# Patient Record
Sex: FEMALE | Race: WHITE | NOT HISPANIC OR LATINO | Employment: FULL TIME | URBAN - METROPOLITAN AREA
[De-identification: names, ages, dates, MRNs, and addresses within clinical notes are randomized per-mention and may not be internally consistent; named-entity substitution may affect disease eponyms.]

---

## 2017-02-10 ENCOUNTER — OFFICE VISIT (OUTPATIENT)
Dept: UROLOGY | Facility: CLINIC | Age: 32
End: 2017-02-10
Payer: COMMERCIAL

## 2017-02-10 ENCOUNTER — TELEPHONE (OUTPATIENT)
Dept: UROLOGY | Facility: CLINIC | Age: 32
End: 2017-02-10

## 2017-02-10 VITALS
SYSTOLIC BLOOD PRESSURE: 121 MMHG | DIASTOLIC BLOOD PRESSURE: 78 MMHG | HEIGHT: 67 IN | WEIGHT: 152.13 LBS | BODY MASS INDEX: 23.88 KG/M2 | HEART RATE: 72 BPM

## 2017-02-10 DIAGNOSIS — R33.9 INCOMPLETE EMPTYING OF BLADDER: ICD-10-CM

## 2017-02-10 DIAGNOSIS — M62.89 HIGH-TONE PELVIC FLOOR DYSFUNCTION: Primary | ICD-10-CM

## 2017-02-10 DIAGNOSIS — N36.8 PAIN IN URETHRAL MEATUS: ICD-10-CM

## 2017-02-10 PROCEDURE — 99999 PR PBB SHADOW E&M-EST. PATIENT-LVL III: CPT | Mod: PBBFAC,,, | Performed by: UROLOGY

## 2017-02-10 PROCEDURE — 99213 OFFICE O/P EST LOW 20 MIN: CPT | Mod: S$GLB,,, | Performed by: UROLOGY

## 2017-02-10 RX ORDER — FLUVOXAMINE MALEATE 100 MG/1
100 TABLET, COATED ORAL
COMMUNITY

## 2017-02-10 RX ORDER — NORETHINDRONE ACETATE AND ETHINYL ESTRADIOL, ETHINYL ESTRADIOL AND FERROUS FUMARATE 1MG-10(24)
KIT ORAL
COMMUNITY
Start: 2017-02-09

## 2017-02-10 RX ORDER — LIDOCAINE HYDROCHLORIDE 20 MG/ML
JELLY TOPICAL DAILY
Qty: 240 ML | Refills: 3 | Status: SHIPPED | OUTPATIENT
Start: 2017-02-10

## 2017-02-10 NOTE — MR AVS SNAPSHOT
[facility] Urology 4th Floor  [address]  [facility] [address]  Phone: [phone]                  Dolores Nicole   2/10/2017 3:40 PM   Office Visit    Description:  Female : 1985   Provider:  Marie Bergman MD   Department:  [facility] Urolog 4th Floor           Reason for Visit     Follow-up           Diagnoses this Visit        Comments    High-tone pelvic floor dysfunction    -  Primary     Incomplete emptying of bladder         Pain in urethral meatus                To Do List           Future Appointments        Provider Department Dept Phone    3/10/2017 10:40 AM Marie Bergman MD [facility] Urology 4th Floor [phone]      Goals (5 Years of Data)     None       These Medications        Disp Refills Start End    LIDOCAINE 2 %, VALIUM 5 MG, BACLOFEN 4 % SUPPOSITORY 30 each 3 2/10/2017     Place 1 suppository rectally 2 (two) times daily. - Rectal    Pharmacy: [facility] [id number] [facility] Ph #: [phone]       lidocaine HCL 2% (XYLOCAINE) 2 % jelly 240 mL 3 2/10/2017     Apply topically once daily. - Topical (Top)    Pharmacy: [facility] - [facility] [id number] [facility] Ph #: [phone]         [facility] On [facility]     [facility] On Call Nurse Care Line -  Assistance  Registered nurses in the [facility] provide clinical advisement, health education, appointment booking, and other advisory services.  Call for this free service at [phone].             Medications           Message regarding Medications     Verify the changes and/or additions to your medication regime listed below are the same as discussed with your clinician today.  If any of these changes or additions are incorrect, please notify your healthcare provider.        START taking these NEW medications        Refills    LIDOCAINE 2 %, VALIUM 5 MG, BACLOFEN 4 % SUPPOSITORY 3    Sig: Place 1 suppository rectally 2 (two) times daily.    Class: Print     "Route: Rectal    lidocaine HCL 2% (XYLOCAINE) 2 % jelly 3    Sig: Apply topically once daily.    Class: Normal    Route: Topical (Top)      STOP taking these medications     etonogestrel-ethinyl estradiol (NUVARING) 0.12-0.015 mg/24 hr vaginal ring Place 1 each vaginally every 28 days.    hydrocodone-acetaminophen 7.5-325mg (NORCO) 7.5-325 mg per tablet Take 1 tablet by mouth every 6 (six) hours as needed for Pain.           Verify that the below list of medications is an accurate representation of the medications you are currently taking.  If none reported, the list may be blank. If incorrect, please contact your healthcare provider. Carry this list with you in case of emergency.           Current Medications     clonazepam (KLONOPIN) 1 MG tablet Take 1 mg by mouth once daily.      LO LOESTRIN FE 1 mg-10 mcg (24)/10 mcg (2) Tab     fluvoxaMINE (LUVOX) 100 MG tablet Take 100 mg by mouth.    LIDOCAINE 2 %, VALIUM 5 MG, BACLOFEN 4 % SUPPOSITORY Place 1 suppository rectally 2 (two) times daily.    lidocaine HCL 2% (XYLOCAINE) 2 % jelly Apply topically once daily.    antonio-m.blue-s.phos-phsal-hyo 118-10-40.8-36 mg Cap Take 1 capsule by mouth 4 (four) times daily.    tamsulosin (FLOMAX) 0.4 mg Cp24 Take 1 capsule (0.4 mg total) by mouth once daily.           Clinical Reference Information           Your Vitals Were     BP Pulse Height Weight Last Period BMI    121/78 72 5' 7" (1.702 m) 69 kg (152 lb 1.9 oz) 01/23/2017 23.82 kg/m2      Blood Pressure          Most Recent Value    BP  121/78      Allergies as of 2/10/2017     Ceclor [Cefaclor]    Entex [Pseudoephedrine Tannate]    Hydrocodone      Immunizations Administered on Date of Encounter - 2/10/2017     None      [facility] Sign-Up     Activating your [facility] account is as easy as 1-2-3!     1) Visit [email or web address], select Sign Up Now, enter this activation code and your date of birth, then select Next.  [email or web address]  Expires: 3/27/2017  5:38 PM      2) " Create a username and password to use when you visit [facility] in the future and select a security question in case you lose your password and select Next.    3) Enter your e-mail address and click Sign Up!    Additional Information  If you have questions, please e-mail [email or web address] or call [phone] to talk to our [employer] staff. Remember, [facility] is NOT to be used for urgent needs. For medical emergencies, dial 911.         Language Assistance Services     ATTENTION: Language assistance services are available, free of charge. Please call [phone].      ATENCIÓN: Si habla español, tiene a rivas disposición servicios gratuitos de asistencia lingüística. Llame al [phone].     CHÚ Ý: N?u b?n nói Ti?ng Vi?t, có các d?ch v? h? tr? ngôn ng? mi?n phí dành cho b?n. G?i s? [phone].         [facility] 4th Floor complies with applicable Federal civil rights laws and does not discriminate on the basis of race, color, national origin, age, disability, or sex.

## 2017-02-10 NOTE — TELEPHONE ENCOUNTER
----- Message from Nisa Sanford sent at 2/10/2017 12:23 PM CST -----  Contact: pt [phone]  Pt states she is having urinary retention and pain every time she voids for the past few days and  Dr Bergman told her to leave a message for Teri if she has these problems.

## 2017-02-11 NOTE — PROGRESS NOTES
CHIEF COMPLAINT:    Mrs. Nicole is a 31 y.o. female presenting for difficulty urinating    PRESENTING ILLNESS:    Dolores Nicole is a 31 y.o. female was fit in today as an urgent appointment.  She states that the urination continues to be an issue.  She still has to splash water on herself in order to urinate and that it continues to be painful in spite of going to physical therapy.  Neisha Xochilt has been releasing some muscle tension but she states that the area that is tender is the urethra.  Vaginally, she is better.  However, today with my exam, she could not relax enough to allow even a urojet to be injected into the bladder.  She notes that sometimes the urine sprays rather than being a stream.      REVIEW OF SYSTEMS:    Review of Systems   Constitutional: Negative.    HENT: Negative.    Eyes: Negative.    Respiratory: Negative.    Cardiovascular: Negative.    Gastrointestinal: Negative for diarrhea.   Genitourinary: Positive for dysuria and urgency.   Musculoskeletal: Negative.    Skin: Negative.    Neurological: Negative.    Endo/Heme/Allergies: Negative.    Psychiatric/Behavioral: Negative.      PATIENT HISTORY:    Past Medical History   Diagnosis Date    Anxiety disorder      OCD       Past Surgical History   Procedure Laterality Date    Tonsillectomy, adenoidectomy, bilateral myringotomy and tubes      Cystoscopy w/ retrogrades      Tympanoplasty      Anterior cruciate ligament repair      [place] tooth extraction       2004       Family History   Problem Relation Age of Onset    Hypertension Father     Cancer Maternal Grandfather      Lung Cancer    Cancer Paternal Grandmother     Cancer Paternal Grandfather      Social History    Marital status:      Social History Main Topics    Smoking status: Never Smoker    Smokeless tobacco: Never Used    Alcohol use Yes      Comment: social    Drug use: No    Sexual activity: Yes     Partners: Male      Comment: Nuvaring       Allergies:  Ceclor  [cefaclor]; Entex [pseudoephedrine tannate]; and Hydrocodone    Medications:  Outpatient Encounter Prescriptions as of 2/10/2017   Medication Sig Dispense Refill    clonazepam (KLONOPIN) 1 MG tablet Take 1 mg by mouth once daily.        LO LOESTRIN FE 1 mg-10 mcg (24)/10 mcg (2) Tab       fluvoxaMINE (LUVOX) 100 MG tablet Take 100 mg by mouth.      LIDOCAINE 2 %, VALIUM 5 MG, BACLOFEN 4 % SUPPOSITORY Place 1 suppository rectally 2 (two) times daily. 30 each 3    lidocaine HCL 2% (XYLOCAINE) 2 % jelly Apply topically once daily. 240 mL 3    methen-m.blue-s.phos-phsal-hyo 118-10-40.8-36 mg Cap Take 1 capsule by mouth 4 (four) times daily. 120 capsule 1    tamsulosin (FLOMAX) 0.4 mg Cp24 Take 1 capsule (0.4 mg total) by mouth once daily. 30 capsule 1     No facility-administered encounter medications on file as of 2/10/2017.          PHYSICAL EXAMINATION:    The patient generally appears in good health, is appropriately interactive, and is in no apparent distress.    Skin: No lesions.    Mental: Cooperative with normal affect.    Neuro: Grossly intact.    HEENT: Normal. No evidence of lymphadenopathy.    Chest:  normal inspiratory effort.    Abdomen: BS active. Soft, non-tender. No masses or organomegaly. Bladder is not palpable. No evidence of flank discomfort. No evidence of inguinal hernia.    Extremities: No clubbing, cyanosis, or edema    Normal external female genitalia  Urethral meatus is normal, externally, appears normal.  Urethra and bladder are tender to bimanual exam  Well supported anteriorly and posteriorly  Increased pelvic floor tone, could not place urojet  PVR by bladder scan checked by the nurse was 250 ml    IMPRESSION:    Encounter Diagnoses   Name Primary?    High-tone pelvic floor dysfunction Yes    Incomplete emptying of bladder     Pain in urethral meatus        PLAN:    1. Cystoscopy under anesthesia with botox of the area around the bladder neck and urethra   2.  If she has scar  tissue will see how she does, and if she still has difficulty urinating, then would consider female urethroplasty but she should be able to tolerate the catheter better after the Botox  3.  Lidocaine, valium, baclofen suppositories once a night  4.  Lidocaine gel Rx'd

## 2017-02-13 ENCOUNTER — TELEPHONE (OUTPATIENT)
Dept: UROLOGY | Facility: CLINIC | Age: 32
End: 2017-02-13

## 2017-02-13 DIAGNOSIS — R33.9 INCOMPLETE EMPTYING OF BLADDER: Primary | ICD-10-CM

## 2017-02-13 DIAGNOSIS — R39.89 BLADDER PAIN: ICD-10-CM

## 2017-02-13 NOTE — TELEPHONE ENCOUNTER
spoike to the pharmacist.  They will use a 2 ml mold and use 30 mg lidocaine and 70 mg baclofen, which just approximates the percentages, for consistency.

## 2017-02-13 NOTE — TELEPHONE ENCOUNTER
----- Message from Teri Mcgrath LPN sent at 2/13/2017  9:31 AM CST -----  Contact: [facility] [phone]      ----- Message -----     From: Nisa Sanford     Sent: 2/13/2017   9:06 AM       To: Madalyn Salazar Staff    States he is calling to clarify the strength is correct on medication LIDOCAINE 2 %, VALIUM 5 MG, BACLOFEN 4 % SUPPOSITORY.     [facility]  [phone]

## 2017-02-14 ENCOUNTER — TELEPHONE (OUTPATIENT)
Dept: UROLOGY | Facility: CLINIC | Age: 32
End: 2017-02-14

## 2017-02-14 NOTE — TELEPHONE ENCOUNTER
----- Message from Marie Bergman MD sent at 2/13/2017  6:24 PM CST -----  Contact: [phone]  It is OK for the patient to be off for a couple of days.  OK to hold PT for now.    ----- Message -----     From: Teri Mcgrath LPN     Sent: 2/13/2017   3:44 PM       To: Marie Bergman MD        ----- Message -----     From: Ayde Gamble MA     Sent: 2/13/2017   3:40 PM       To: Madalyn Salazar Staff    Pt has questions about how long she should take off work for her procedure. She states that Dr. Bergman told her that after botox her symptoms get worse before they get better so she does not want to be at work having those issues. Also, she wants to know should she continue PT

## 2017-02-14 NOTE — TELEPHONE ENCOUNTER
LM on pt's VM informing her, per , that it will be okay to take a couple of days off and she is able to put physical therapy on hold for now.

## 2017-02-15 ENCOUNTER — TELEPHONE (OUTPATIENT)
Dept: UROLOGY | Facility: CLINIC | Age: 32
End: 2017-02-15

## 2017-02-15 NOTE — TELEPHONE ENCOUNTER
----- Message from Marie Bergman MD sent at 2/13/2017  6:24 PM CST -----  Contact: [phone]  It is OK for the patient to be off for a couple of days.  OK to hold PT for now.    ----- Message -----     From: Teri Mcgrath LPN     Sent: 2/13/2017   3:44 PM       To: Marie Bergman MD        ----- Message -----     From: Ayde Gamble MA     Sent: 2/13/2017   3:40 PM       To: [facility] Salazar Staff    Pt has questions about how long she should take off work for her procedure. She states that Dr. Bergman told her that after botox her symptoms get worse before they get better so she does not want to be at work having those issues. Also, she wants to know should she continue PT    Pt notified

## 2017-03-13 ENCOUNTER — ANESTHESIA EVENT (OUTPATIENT)
Dept: SURGERY | Facility: HOSPITAL | Age: 32
End: 2017-03-13
Payer: COMMERCIAL

## 2017-03-13 NOTE — ANESTHESIA PREPROCEDURE EVALUATION
Pre Admission Screening  Charlotte Oneil RN      []Hide copied text  Anesthesia Assessment: Preoperative EQUATION     Planned Procedure: Procedure(s) (LRB):  CYSTOSCOPY (N/A)  INJECTION-BOTOX TO THE BLADDER NECK (N/A)  Requested Anesthesia Type:Monitor Anesthesia Care  Surgeon: Marie Bergman MD  Service: Urology  Known or anticipated Date of Surgery:3/21/2017     Patient Active Problem List   Diagnosis    Knee pain, right    S/P right ACL reconstruction on 4/12/12    MARKOS (stress urinary incontinence, female)    Urethral stricture    Pelvic floor dysfunction       Surgeon notes: reviewed     Electronic QUestionnaire Assessment completed via nurse interview with patient.                    Dolores Nicole [[id number]] - 31 y.o. Female         Providers Outside of [facility]       No data to display        Surgical Risk Level       Surgical Risk Level:   1              caRDScore (Clinical Anesthesia Rapid Decision Score)         Very Very Low  Total Score: 1       1 Sum of Clinical Scores        caRDScores (Grouped)       caRDScore - Ane:   1                       caRDScore - CVD:   -1                       caRDScore - Pul:   0                       caRDScore - Met:   0                       caRDScore - Phy:   1              caRDScore Items             Pre[facility]admit from 3/21/2017 in [facility]      Anesthesia        Has chronic anxiety   Yes      CVD        Activity similar to best ability for maximal activity or exercise   METS 5      Pulmonary        Metabolic        Physiologic        Obesity Status   Not Obese (BMI <30)      Has problems emptying bladder/ u. retent. due to nerve/prostate/bladder/pelvic dis.   Yes        Flags       Red Flag Score:   0                       Yellow Flag Score:   1              Red Flags             Pre-admit from 3/21/2017 in [facility]      Obesity Status   Not Obese (BMI <30)        Yellow Flags             Pre-admit from 3/21/2017 in  [facility]      Obesity Status   Not Obese (BMI <30)      Has pain   Yes        PONV Risk Score (assumes periop narcotic use = +1, Max=4)       PONV Risk Score:   3              PONV Risk Factors  Total Score: 2       1 Female      1 Non-Smoker at present        Sleep Apnea  Total Score: 0         JOSE ALBERTO STOP-Bang Risk Factors (Max=8)  Total Score: 0         JOSE ALBERTO Risk Level - 1 (Low), 2 (Moderate), 3 (High)       JOSE ALBERTO Risk Level:   0              RCRI (Revised Cardiac Risk Indices of ACC/AHA guidelines, Max=6)  Total Score: 0         CAD Risk Factors  Total Score: 1       1 Exercise on a routine basis        CHADS Score if applicable (history of atrial fib/flutter, Max=6)  Total Score: 0         Maximal Exercise Capacity             Pre-admit from 3/21/2017 in [facility]      Maximal Exercise Capacity   METS 5        Summary of Dependence  Total Score: 1       1 Is totally independent of others for activities of daily living        Phone Fraility Score (Max = 17)  Total Score: 0         Pain Factors             Pre-admit from 3/21/2017 in [facility]      Has pain   Yes      Location and description of pain   bladder      Typical Pain Scores   0 to 4      Not using strong pain medications   Yes [lidocaine and baclofen suppositories at night]        Risk Triggers (Evidence-Based Risk Triggers)         Pulmonary Risk Triggers  Total Score: 0         Renal Risk Triggers  Total Score: 0         Delirium Risk Triggers  Total Score: 0         Urologic Risk Triggers  Total Score: 1       1 Has problems emptying bladder/ u. retent. due to nerve/prostate/bladder/pelvic dis.        Logistics         Pre-op Clinic Logistics  Total Score: 1       1 Has had anesthesia, either as adult or as a child        DOSC Logistics  Total Score: 0         Discharge Logistics  Total Score: 0         Discharge Planning             Pre-admit from 3/21/2017 in [facility]       Discharge Planning        Will assist patient 24/7, if needed   mom      Who will transport you to therapy, if need   mom        Fast Track <For office use only>       Total Score: 1          Surgical Risk Level Assessment                       Triage considerations:      The patient has no apparent active cardiac condition (No unstable coronary Syndrome such as severe unstable angina or recent [<1 month] myocardial infarction, decompensated CHF, severe valvular disease or significant arrhythmia)     Previous anesthesia records:GETA, MAC and No problems urethra dilation 11/08/16; Placement Time: 1037; Inserted by: CRNA; Airway Device: LMA; Mask Ventilation: Easy; Intubated: Postinduction; Airway Device Size: 4.0; Style: Cuffed; Cuff Inflation: Minimal occlusive pressure; Inflation Amount: 25; Placement Verified By: Auscultation, Capnometry; Complicating Factors: None; Intubation Findings: Positive EtCO2, Bilateral breath sounds, Atraumatic/Condition of teeth unchanged; Securment: Lips; Complications: None; Breath Sounds: Equal Bilateral; Insertion Attempts: 1;      [facility]   Subspecialty notes: n/a     Other important co-morbidities: none noted      Tests already available:  No recent tests.      Instructions given. (See in Nurse's note)     Optimization:  Anesthesia Preop Clinic Assessment Indicated-not required for this procedure      Plan:   Testing: BMP  Patient has previously scheduled Medical Appointment:none     Navigation: Tests Scheduled. Am of surgery      Straight Line to surgery.   No tests, anesthesia preop clinic visit, or consult required.           Electronically signed by Charlotte Oneil RN at 3/13/2017 11:53 AM        Pre-admit on 3/21/2017              Detailed Report                                                                                                                         03/13/2017  Congersed Nicole is a 31 y.o., female.    [facility] Anesthesia  Evaluation    I have reviewed the Patient Summary Reports.    I have reviewed the Nursing Notes.   I have reviewed the Medications.     Review of Systems      Physical Exam  General:  Well nourished    Airway/Jaw/Neck:  Airway Findings: Mouth Opening: Normal General Airway Assessment: Adult  Mallampati: II  Improves to II with phonation.  Jaw/Neck Findings:  Limited Ability to Prognath  Neck ROM: Normal ROM     Eyes/Ears/Nose:  Eyes/Ears/Nose Findings:    Dental:  Dental Findings: In tact   Chest/Lungs:  Chest/Lungs Findings: Clear to auscultation, Normal Respiratory Rate     Heart/Vascular:  Heart Findings: Rate: Normal  Rhythm: Regular Rhythm  Sounds: Normal     Abdomen:  Abdomen Findings:  Normal     Musculoskeletal:  Musculoskeletal Findings:    Skin:  Skin Findings:     Mental Status:  Mental Status Findings:  Cooperative, Alert and Oriented         Anesthesia Plan  Type of Anesthesia, risks & benefits discussed:  Anesthesia Type:  general, MAC  Patient's Preference:   Intra-op Monitoring Plan:   Intra-op Monitoring Plan Comments:   Post Op Pain Control Plan:   Post Op Pain Control Plan Comments:   Induction:   IV  Beta Blocker:  Patient is not currently on a Beta-Blocker (No further documentation required).       Informed Consent: Patient understands risks and agrees with Anesthesia plan.  Questions answered. Anesthesia consent signed with patient.  ASA Score: 1     Day of Surgery Review of History & Physical:    H&P update referred to the surgeon.         Ready For Surgery From Anesthesia Perspective.

## 2017-03-13 NOTE — PRE ADMISSION SCREENING
Anesthesia Assessment: Preoperative EQUATION    Planned Procedure: Procedure(s) (LRB):  CYSTOSCOPY (N/A)  INJECTION-BOTOX TO THE BLADDER NECK (N/A)  Requested Anesthesia Type:Monitor Anesthesia Care  Surgeon: Marie Bergman MD  Service: Urology  Known or anticipated Date of Surgery:3/21/2017    Surgeon notes: reviewed    Electronic QUestionnaire Assessment completed via nurse interview with patient.        Dolores Nicole [[id number]] - 31 y.o. Female        Providers Outside of [facility]      No data to display       Surgical Risk Level      Surgical Risk Level:  1           caRDScore (Clinical Anesthesia Rapid Decision Score)        Very Very Low  Total Score: 1      1 Sum of Clinical Scores       caRDScores (Grouped)      caRDScore - Ane:  1                caRDScore - CVD:  -1                caRDScore - Pul:  0                caRDScore - Met:  0                caRDScore - Phy:  1           caRDScore Items           Pre-admit from 3/21/2017 in [facility]     Anesthesia      Has chronic anxiety  Yes     CVD      Activity similar to best ability for maximal activity or exercise  METS 5     Pulmonary      Metabolic      Physiologic      Obesity Status  Not Obese (BMI <30)     Has problems emptying bladder/ u. retent. due to nerve/prostate/bladder/pelvic dis.  Yes       Flags      Red Flag Score:  0                Yellow Flag Score:  1           Red Flags           Pre-admit from 3/21/2017 in [facility]     Obesity Status  Not Obese (BMI <30)       Yellow Flags           Pre-admit from 3/21/2017 in [facility]     Obesity Status  Not Obese (BMI <30)     Has pain  Yes       PONV Risk Score (assumes periop narcotic use = +1, Max=4)      PONV Risk Score:  3           PONV Risk Factors  Total Score: 2      1 Female     1 Non-Smoker at present       Sleep Apnea  Total Score: 0        JOSE ALBERTO STOP-Bang Risk Factors (Max=8)  Total Score: 0        JOSE ALBERTO Risk Level - 1 (Low),  2 (Moderate), 3 (High)      JOSE ALBERTO Risk Level:  0           RCRI (Revised Cardiac Risk Indices of ACC/AHA guidelines, Max=6)  Total Score: 0        CAD Risk Factors  Total Score: 1      1 Exercise on a routine basis       CHADS Score if applicable (history of atrial fib/flutter, Max=6)  Total Score: 0        Maximal Exercise Capacity           Pre-admit from 3/21/2017 in [facility]     Maximal Exercise Capacity  METS 5       Summary of Dependence  Total Score: 1      1 Is totally independent of others for activities of daily living       Phone Fraility Score (Max = 17)  Total Score: 0        Pain Factors           Pre-admit from 3/21/2017 in [facility]     Has pain  Yes     Location and description of pain  bladder     Typical Pain Scores  0 to 4     Not using strong pain medications  Yes [lidocaine and baclofen suppositories at night]       Risk Triggers (Evidence-Based Risk Triggers)        Pulmonary Risk Triggers  Total Score: 0        Renal Risk Triggers  Total Score: 0        Delirium Risk Triggers  Total Score: 0        Urologic Risk Triggers  Total Score: 1      1 Has problems emptying bladder/ u. retent. due to nerve/prostate/bladder/pelvic dis.       Logistics        Pre-op Clinic Logistics  Total Score: 1      1 Has had anesthesia, either as adult or as a child       DOSC Logistics  Total Score: 0        Discharge Logistics  Total Score: 0        Discharge Planning           Pre-admit from 3/21/2017 in [facility]     Discharge Planning      Will assist patient 24/7, if needed  mom     Who will transport you to therapy, if need  mom       Fast Track <For office use only>      Total Score: 1        Surgical Risk Level Assessment                 Triage considerations:     The patient has no apparent active cardiac condition (No unstable coronary Syndrome such as severe unstable angina or recent [<1 month] myocardial infarction, decompensated CHF, severe  valvular   disease or significant arrhythmia)    Previous anesthesia records:GETA, MAC and No problems urethra dilation 11/08/16; Placement Time: 1037; Inserted by: CRNA; Airway Device: LMA; Mask Ventilation: Easy; Intubated: Postinduction; Airway Device Size: 4.0; Style: Cuffed; Cuff Inflation: Minimal occlusive pressure; Inflation Amount: 25; Placement Verified By: Auscultation, Capnometry; Complicating Factors: None; Intubation Findings: Positive EtCO2, Bilateral breath sounds, Atraumatic/Condition of teeth unchanged; Securment: Lips; Complications: None; Breath Sounds: Equal Bilateral; Insertion Attempts: 1;     Last PCP note: 3-6 months ago , outside [facility]   Subspecialty notes: n/a    Other important co-morbidities: none noted     Tests already available:  No recent tests.            Instructions given. (See in Nurse's note)    Optimization:  Anesthesia Preop Clinic Assessment  Indicated-not required for this procedure       Plan:    Testing:  BMP     Patient  has previously scheduled Medical Appointment:none    Navigation: Tests Scheduled. Am of surgery                            Straight Line to surgery.               No tests, anesthesia preop clinic visit, or consult required.

## 2017-03-20 ENCOUNTER — TELEPHONE (OUTPATIENT)
Dept: UROLOGY | Facility: CLINIC | Age: 32
End: 2017-03-20

## 2017-03-20 NOTE — TELEPHONE ENCOUNTER
Pt called back to confirm 11am arrival time for tomorrow. NPO instructions were given and pt verbalized understanding..

## 2017-03-21 ENCOUNTER — HOSPITAL ENCOUNTER (OUTPATIENT)
Facility: HOSPITAL | Age: 32
Discharge: HOME | End: 2017-03-21
Attending: UROLOGY | Admitting: UROLOGY
Payer: COMMERCIAL

## 2017-03-21 ENCOUNTER — ANESTHESIA (OUTPATIENT)
Dept: SURGERY | Facility: HOSPITAL | Age: 32
End: 2017-03-21
Payer: COMMERCIAL

## 2017-03-21 VITALS
SYSTOLIC BLOOD PRESSURE: 121 MMHG | BODY MASS INDEX: 24.33 KG/M2 | HEART RATE: 60 BPM | HEIGHT: 67 IN | OXYGEN SATURATION: 100 % | TEMPERATURE: 98 F | DIASTOLIC BLOOD PRESSURE: 80 MMHG | WEIGHT: 155 LBS | RESPIRATION RATE: 18 BRPM

## 2017-03-21 DIAGNOSIS — Z01.818 PREOPERATIVE TESTING: ICD-10-CM

## 2017-03-21 DIAGNOSIS — M62.89 PELVIC FLOOR DYSFUNCTION: ICD-10-CM

## 2017-03-21 LAB
ANION GAP SERPL CALC-SCNC: 12 MMOL/L
B-HCG UR QL: NEGATIVE
BUN SERPL-MCNC: 16 MG/DL
CALCIUM SERPL-MCNC: 9.3 MG/DL
CHLORIDE SERPL-SCNC: 106 MMOL/L
CO2 SERPL-SCNC: 23 MMOL/L
CREAT SERPL-MCNC: 0.8 MG/DL
CTP QC/QA: YES
EST. GFR  (AFRICAN AMERICAN): >60 ML/MIN/1.73 M^2
EST. GFR  (NON AFRICAN AMERICAN): >60 ML/MIN/1.73 M^2
GLUCOSE SERPL-MCNC: 84 MG/DL
POTASSIUM SERPL-SCNC: 3.7 MMOL/L
SODIUM SERPL-SCNC: 141 MMOL/L

## 2017-03-21 PROCEDURE — 37000008 HC ANESTHESIA 1ST 15 MINUTES: Performed by: UROLOGY

## 2017-03-21 PROCEDURE — 25000003 PHARM REV CODE 250: Performed by: STUDENT IN AN ORGANIZED HEALTH CARE EDUCATION/TRAINING PROGRAM

## 2017-03-21 PROCEDURE — 25000003 PHARM REV CODE 250

## 2017-03-21 PROCEDURE — 71000015 HC POSTOP RECOV 1ST HR: Performed by: UROLOGY

## 2017-03-21 PROCEDURE — 71000039 HC RECOVERY, EACH ADD'L HOUR: Performed by: UROLOGY

## 2017-03-21 PROCEDURE — 63600175 PHARM REV CODE 636 W HCPCS: Performed by: STUDENT IN AN ORGANIZED HEALTH CARE EDUCATION/TRAINING PROGRAM

## 2017-03-21 PROCEDURE — 63600175 PHARM REV CODE 636 W HCPCS: Performed by: UROLOGY

## 2017-03-21 PROCEDURE — 25000003 PHARM REV CODE 250: Performed by: UROLOGY

## 2017-03-21 PROCEDURE — 25000003 PHARM REV CODE 250: Performed by: ANESTHESIOLOGY

## 2017-03-21 PROCEDURE — D9220A PRA ANESTHESIA: Mod: CRNA,,, | Performed by: NURSE ANESTHETIST, CERTIFIED REGISTERED

## 2017-03-21 PROCEDURE — 37000009 HC ANESTHESIA EA ADD 15 MINS: Performed by: UROLOGY

## 2017-03-21 PROCEDURE — 36000707: Performed by: UROLOGY

## 2017-03-21 PROCEDURE — 52287 CYSTOSCOPY CHEMODENERVATION: CPT | Mod: ,,, | Performed by: UROLOGY

## 2017-03-21 PROCEDURE — 71000033 HC RECOVERY, INTIAL HOUR: Performed by: UROLOGY

## 2017-03-21 PROCEDURE — 36000706: Performed by: UROLOGY

## 2017-03-21 PROCEDURE — 25000003 PHARM REV CODE 250: Performed by: NURSE ANESTHETIST, CERTIFIED REGISTERED

## 2017-03-21 PROCEDURE — 81025 URINE PREGNANCY TEST: CPT | Performed by: UROLOGY

## 2017-03-21 PROCEDURE — D9220A PRA ANESTHESIA: Mod: ANES,,, | Performed by: ANESTHESIOLOGY

## 2017-03-21 PROCEDURE — 63600175 PHARM REV CODE 636 W HCPCS: Performed by: NURSE ANESTHETIST, CERTIFIED REGISTERED

## 2017-03-21 PROCEDURE — 80048 BASIC METABOLIC PNL TOTAL CA: CPT

## 2017-03-21 RX ORDER — TRAMADOL HYDROCHLORIDE 50 MG/1
50 TABLET ORAL EVERY 4 HOURS PRN
Status: DISCONTINUED | OUTPATIENT
Start: 2017-03-21 | End: 2017-03-21 | Stop reason: HOSPADM

## 2017-03-21 RX ORDER — LIDOCAINE HYDROCHLORIDE 20 MG/ML
JELLY TOPICAL
Status: DISCONTINUED | OUTPATIENT
Start: 2017-03-21 | End: 2017-03-21 | Stop reason: HOSPADM

## 2017-03-21 RX ORDER — PROPOFOL 10 MG/ML
VIAL (ML) INTRAVENOUS
Status: DISCONTINUED | OUTPATIENT
Start: 2017-03-21 | End: 2017-03-21

## 2017-03-21 RX ORDER — FENTANYL CITRATE 50 UG/ML
25 INJECTION, SOLUTION INTRAMUSCULAR; INTRAVENOUS EVERY 5 MIN PRN
Status: DISCONTINUED | OUTPATIENT
Start: 2017-03-21 | End: 2017-03-21 | Stop reason: HOSPADM

## 2017-03-21 RX ORDER — PROPOFOL 10 MG/ML
VIAL (ML) INTRAVENOUS CONTINUOUS PRN
Status: DISCONTINUED | OUTPATIENT
Start: 2017-03-21 | End: 2017-03-21

## 2017-03-21 RX ORDER — POLYETHYLENE GLYCOL 3350 17 G/17G
17 POWDER, FOR SOLUTION ORAL DAILY
Qty: 30 PACKET | Refills: 0 | Status: SHIPPED | OUTPATIENT
Start: 2017-03-21 | End: 2017-04-05

## 2017-03-21 RX ORDER — LIDOCAINE HCL/PF 100 MG/5ML
SYRINGE (ML) INTRAVENOUS
Status: DISCONTINUED | OUTPATIENT
Start: 2017-03-21 | End: 2017-03-21

## 2017-03-21 RX ORDER — ONDANSETRON 8 MG/1
8 TABLET, ORALLY DISINTEGRATING ORAL EVERY 8 HOURS PRN
Status: DISCONTINUED | OUTPATIENT
Start: 2017-03-21 | End: 2017-03-21 | Stop reason: HOSPADM

## 2017-03-21 RX ORDER — MIDAZOLAM HYDROCHLORIDE 1 MG/ML
INJECTION, SOLUTION INTRAMUSCULAR; INTRAVENOUS
Status: DISCONTINUED | OUTPATIENT
Start: 2017-03-21 | End: 2017-03-21

## 2017-03-21 RX ORDER — TRAMADOL HYDROCHLORIDE 50 MG/1
TABLET ORAL
Status: COMPLETED
Start: 2017-03-21 | End: 2017-03-21

## 2017-03-21 RX ORDER — SODIUM CHLORIDE 0.9 % (FLUSH) 0.9 %
3 SYRINGE (ML) INJECTION
Status: DISCONTINUED | OUTPATIENT
Start: 2017-03-21 | End: 2017-03-21 | Stop reason: HOSPADM

## 2017-03-21 RX ORDER — FENTANYL CITRATE 50 UG/ML
INJECTION, SOLUTION INTRAMUSCULAR; INTRAVENOUS
Status: DISCONTINUED | OUTPATIENT
Start: 2017-03-21 | End: 2017-03-21

## 2017-03-21 RX ORDER — LIDOCAINE HYDROCHLORIDE 10 MG/ML
1 INJECTION INFILTRATION; PERINEURAL ONCE
Status: COMPLETED | OUTPATIENT
Start: 2017-03-21 | End: 2017-03-21

## 2017-03-21 RX ORDER — TRAMADOL HYDROCHLORIDE 50 MG/1
50 TABLET ORAL EVERY 6 HOURS PRN
Qty: 15 TABLET | Refills: 0 | Status: SHIPPED | OUTPATIENT
Start: 2017-03-21 | End: 2017-03-31

## 2017-03-21 RX ORDER — SODIUM CHLORIDE 9 MG/ML
INJECTION, SOLUTION INTRAVENOUS CONTINUOUS
Status: DISCONTINUED | OUTPATIENT
Start: 2017-03-21 | End: 2017-03-21 | Stop reason: HOSPADM

## 2017-03-21 RX ADMIN — FENTANYL CITRATE 50 MCG: 50 INJECTION, SOLUTION INTRAMUSCULAR; INTRAVENOUS at 01:03

## 2017-03-21 RX ADMIN — LIDOCAINE HYDROCHLORIDE 100 MG: 20 INJECTION, SOLUTION INTRAVENOUS at 01:03

## 2017-03-21 RX ADMIN — SODIUM CHLORIDE: 0.9 INJECTION, SOLUTION INTRAVENOUS at 10:03

## 2017-03-21 RX ADMIN — PROPOFOL 70 MG: 10 INJECTION, EMULSION INTRAVENOUS at 01:03

## 2017-03-21 RX ADMIN — GENTAMICIN SULFATE 160 MG: 40 INJECTION, SOLUTION INTRAMUSCULAR; INTRAVENOUS at 12:03

## 2017-03-21 RX ADMIN — MIDAZOLAM HYDROCHLORIDE 2 MG: 1 INJECTION, SOLUTION INTRAMUSCULAR; INTRAVENOUS at 01:03

## 2017-03-21 RX ADMIN — PROPOFOL 200 MCG/KG/MIN: 10 INJECTION, EMULSION INTRAVENOUS at 01:03

## 2017-03-21 RX ADMIN — LIDOCAINE HYDROCHLORIDE 0.1 ML: 10 INJECTION, SOLUTION EPIDURAL; INFILTRATION; INTRACAUDAL; PERINEURAL at 11:03

## 2017-03-21 RX ADMIN — MIDAZOLAM HYDROCHLORIDE 2 MG: 1 INJECTION, SOLUTION INTRAMUSCULAR; INTRAVENOUS at 12:03

## 2017-03-21 RX ADMIN — TRAMADOL HYDROCHLORIDE 50 MG: 50 TABLET, FILM COATED ORAL at 02:03

## 2017-03-21 RX ADMIN — TRAMADOL HYDROCHLORIDE 50 MG: 50 TABLET ORAL at 02:03

## 2017-03-21 NOTE — IP AVS SNAPSHOT
[facility]  [address]  [facility] [address]  Phone: [phone]           Patient Discharge Instructions     Our goal is to set you up for success. This packet includes information on your condition, medications, and your home care. It will help you to care for yourself so you don't get sicker and need to go back to the hospital.     Please ask your nurse if you have any questions.        There are many details to remember when preparing to leave the hospital. Here is what you will need to do:    1. Take your medicine. If you are prescribed medications, review your Medication List in the following pages. You may have new medications to  at the pharmacy and others that you'll need to stop taking. Review the instructions for how and when to take your medications. Talk with your doctor or nurses if you are unsure of what to do.     2. Go to your follow-up appointments. Specific follow-up information is listed in the following pages. Your may be contacted by a transition nurse or clinical provider about future appointments. Be sure we have all of the phone numbers to reach you, if needed. Please contact your provider's office if you are unable to make an appointment.     3. Watch for warning signs. Your doctor or nurse will give you detailed warning signs to watch for and when to call for assistance. These instructions may also include educational information about your condition. If you experience any of warning signs to your health, call your doctor.               [facility]  Unless otherwise directed by your provider, please contact [facility], our nurse care line that is available for 24/7 assistance.     [phone] (toll-free)    Registered nurses in the [facility] provide clinical advisement, health education, appointment booking, and other advisory services.                    ** Verify the list of medication(s) below is accurate and up  to date. Carry this with you in case of emergency. If your medications have changed, please notify your healthcare provider.             Medication List      START taking these medications        Additional Info                      polyethylene glycol 17 gram Pwpk   Commonly known as:  GLYCOLAX   Quantity:  30 packet   Refills:  0   Dose:  17 g    Instructions:  Take 17 g by mouth once daily.     Begin Date    AM    Noon    PM    Bedtime       tramadol 50 mg tablet   Commonly known as:  ULTRAM   Quantity:  15 tablet   Refills:  0   Dose:  50 mg    Last time this was given:  50 mg on 3/21/2017  2:04 PM   Instructions:  Take 1 tablet (50 mg total) by mouth every 6 (six) hours as needed for Pain.     Begin Date    AM    Noon    PM    Bedtime         CONTINUE taking these medications        Additional Info                      clonazePAM 1 MG tablet   Commonly known as:  KLONOPIN   Refills:  0   Dose:  1 mg    Instructions:  Take 1 mg by mouth nightly.     Begin Date    AM    Noon    PM    Bedtime       fluvoxaMINE 100 MG tablet   Commonly known as:  LUVOX   Refills:  0   Dose:  100 mg    Instructions:  Take 100 mg by mouth.     Begin Date    AM    Noon    PM    Bedtime       LIDOCAINE 2 %, VALIUM 5 MG, BACLOFEN 4 % SUPPOSITORY   Quantity:  30 each   Refills:  3   Dose:  1 suppository    Instructions:  Place 1 suppository rectally 2 (two) times daily.     Begin Date    AM    Noon    PM    Bedtime       lidocaine HCL 2% 2 % jelly   Commonly known as:  XYLOCAINE   Quantity:  240 mL   Refills:  3    Instructions:  Apply topically once daily.     Begin Date    AM    Noon    PM    Bedtime       LO LOESTRIN FE 1 mg-10 mcg (24)/10 mcg (2) Tab   Refills:  0   Generic drug:  norethindrone-e.estradiol-iron      Begin Date    AM    Noon    PM    Bedtime       tamsulosin 0.4 mg Cp24   Commonly known as:  FLOMAX   Quantity:  30 capsule   Refills:  1   Dose:  0.4 mg    Instructions:  Take 1 capsule (0.4 mg total) by mouth once  daily.     Begin Date    AM    Noon    PM    Bedtime         STOP taking these medications     methen-m.blue-s.phos-phsal-hyo 118-10-40.8-36 mg Cap            Where to Get Your Medications      You can get these medications from any pharmacy     Bring a paper prescription for each of these medications     polyethylene glycol 17 gram Pwpk    tramadol 50 mg tablet                  Please bring to all follow up appointments:    1. A copy of your discharge instructions.  2. All medicines you are currently taking in their original bottles.  3. Identification and insurance card.    Please arrive 15 minutes ahead of scheduled appointment time.    Please call 24 hours in advance if you must reschedule your appointment and/or time.        Your Scheduled Appointments     Apr 05, 2017 10:40 AM CDT   Post OP with Marie Bergman MD   [facility] - Urology 4th Floor ([facility] )    [address]  [place] [id number] [phone]              Follow-up Information     Follow up with Marie Bergman MD In 2 weeks.    Specialty:  Urology    Why:  post op    Contact information:    [address] [facility] [address] [phone]          Discharge Instructions     Future Orders    Activity as tolerated     Call MD for:  persistent nausea and vomiting     Call MD for:  severe uncontrolled pain     Call MD for:  temperature >100.4     Diet general     Questions:    Total calories:      Fat restriction, if any:      Protein restriction, if any:      Na restriction, if any:      Fluid restriction:      Additional restrictions:      No dressing needed         Discharge Instructions         Cystoscopy    Cystoscopy is a procedure that lets your doctor look directly inside your urethra and bladder. It can be used to:  · Help diagnose a problem with your urethra, bladder, or kidneys.  · Take a sample (biopsy) of bladder or urethral tissue.  · Treat certain problems (such as removing kidney stones).  · Place a stent  to bypass an obstruction.  · Take special X-rays of the kidneys.  Based on the findings, your doctor may recommend other tests or treatments.  What is a cystoscope?  A cystoscope is a telescope-like instrument that contains lenses and fiberoptics (small glass wires that make bright light). The cystoscope may be straight and rigid, or flexible to bend around curves in the urethra. The doctor may look directly into the cystoscope, or project the image onto a monitor.  Getting ready  · Ask your doctor if you should stop taking any medications prior to the procedure.  · Ask whether you should avoid eating or drinking anything after midnight before the procedure.  · Follow any other instructions your doctor gives you.  Tell your doctor before the exam if you:  · Take any medications, such as aspirin or blood thinners  · Have allergies to any medications  · Are pregnant   The procedure  Cystoscopy is done in the doctors office or hospital. The doctor and a nurse are present during the procedure. It takes only a few minutes, longer if a biopsy, X-ray, or treatment needs to be done.  During the procedure:  · You lie on an exam table on your back, knees bent and legs apart. You are covered with a drape.  · Your urethra and the area around it are washed. Anesthetic jelly may be applied to numb the urethra. Other pain medication is usually not needed. In some cases, you may be offered a mild sedative to help you relax. If a more extensive procedure is to be done, such as a biopsy or kidney stone removal, general anesthesia may be needed.  · The cystoscope is inserted. A sterile fluid is put into the bladder to expand it. You may feel pressure from this fluid.  · When the procedure is done, the cystoscope is removed.  After the procedure  If you had a sedative, general anesthesia, or spinal anesthesia, you must have someone drive you home. Once youre home:  · Drink plenty of fluids.  · You may have burning or light bleeding  "when you urinate--this is normal.  · Medications may be prescribed to ease any discomfort or prevent infection. Take these as directed.  · Call your doctor if you have heavy bleeding or blood clots, burning that lasts more than a day, a fever over 100°F  (38° C), or trouble urinating.  Date Last Reviewed: 9/8/2014  © 4723-8540 [employer]. [address] [place] [address], [place] [address]. All rights reserved. This information is not intended as a substitute for professional medical care. Always follow your healthcare professional's instructions.            Primary Diagnosis     Your primary diagnosis was:  Pelvic Floor Dysfunction      Admission Information     Date & Time Provider Department CSN    3/21/2017 10:27 AM Marie Bergman MD [facility] [id number]      Care Providers     Provider Role Specialty Primary office phone    Marie Bergman MD Attending Provider Urology [phone]    Marie Bergman MD Surgeon  Urology [phone]      Your Vitals Were     BP Pulse Temp Resp Height Weight    109/91 (BP Location: Left arm, Patient Position: Sitting, BP Method: Automatic) 66 97.9 °F (36.6 °C) (Oral) 16 5' 7" (1.702 m) 70.3 kg (155 lb)    Last Period SpO2 BMI          03/19/2017 100% 24.28 kg/m2        Recent Lab Values     No lab values to display.      Allergies as of 3/21/2017        Reactions    Ceclor [Cefaclor] Hives    Entex [Pseudoephedrine Tannate] Other (See Comments)    irratabiltity    Hydrocodone       Advance Directives     An advance directive is a document which, in the event you are no longer able to make decisions for yourself, tells your healthcare team what kind of treatment you do or do not want to receive, or who you would like to make those decisions for you.  If you do not currently have an advance directive, [facility] encourages you to create one.  For more information call:  [phone]), [phone] ([phone]),  or log on to " [email or web address].        Language Assistance Services     ATTENTION: Language assistance services are available, free of charge. Please call [phone].      ATENCIÓN: Si ora page, tiene a rivas disposición servicios gratuitos de asistencia lingüística. Llame al [phone].     CHÚ Ý: N?u b?n nói Ti?ng Vi?t, có các d?ch v? h? tr? ngôn ng? mi?n phí dành cho b?n. G?i s? [phone].        [facility] Sign-Up     Activating your [facility] account is as easy as 1-2-3!     1) Visit [email or web address], select Sign Up Now, enter this activation code and your date of birth, then select Next.  [email or web address]  Expires: 3/27/2017  6:38 PM      2) Create a username and password to use when you visit [facility] in the future and select a security question in case you lose your password and select Next.    3) Enter your e-mail address and click Sign Up!    Additional Information  If you have questions, please e-mail [email or web address] or call [phone] to talk to our [facility] staff. Remember, [facility] is NOT to be used for urgent needs. For medical emergencies, dial 911.          [facility] complies with applicable Federal civil rights laws and does not discriminate on the basis of race, color, national origin, age, disability, or sex.

## 2017-03-21 NOTE — PLAN OF CARE
Patient and family given discharge instructions.  Patient and family verbalized understanding of instructions.  All questions answered.  Dr. Bergman spoke with patient and family regarding procedure and follow up.  Patient VSS. Patient tolerating clear liquid diet well.  No n/v.  Patient given an Ultram for pain 4/10.  Patient states its more of a burning feeling than an actual pain.  Patient has IVF infusing to gravity.  Patient's  and mom at bedside.  Patient needs to urinate before being discharged.  Will continue to monitor patient.

## 2017-03-21 NOTE — OP NOTE
[facility]  Operative Note    Date: 03/21/2017    Pre-Op Diagnosis:   1. Pelvic floor dysfunction  2. Urethral stricture  3. Incomplete bladder emptying    Post-Op Diagnosis: same    Procedure(s) Performed:   1.  Cystoscopy with botox injection of bladder neck and urethra.   2.  EUA    Specimen(s): none    Staff Surgeon:  Marie Bergman MD    Assistant Surgeon: Jena Rubi MD    Anesthesia: Monitored Local Anesthesia with Sedation    Indications: Dolores Nicole is a 31 y.o. female with hx of pelvic floor dysfunction and a urethral stricture.     Findings:   1. Tightness at the urethral meatus, able to pass through with 22 Fr sound  2. Bladder with no mucosal abnormalities suspicious for malignancy  3. Squamous metaplasia present on bladder floor  4. 100 units of botox injected into bladder neck and urethral sphincter  5. No vaginal trigger points identified on EUA    Estimated Blood Loss: min    Drains: none    Procedure in Detail:  After informed consent was obtained the patient was brought to the cystoscopy suite and placed in the supine position.  SCDs were applied and working.  Anesthesia was administered.  When the patient was adequately sedated she was placed in the dorsal lithotomy position and prepped and draped in the usual sterile fashion.      A rigid cystoscope in a 22 Fr sheath was introduced into the patients's bladder via the urethra.  The meatus was very tight however we were able to pass a 22 Fr sound. Once past the meatus this passed easily. Bladder findings as above.     100 units of botox was injected into the detrusor muscle of the bladder neck and the external spincter.  Good wheals were raised. EUA was performed and there were no palpable vaginal trigger points.     The patient tolerated the procedure well and was transferred to the recovery room in stable condition.      Disposition:  The patient will follow up with Dr. Bergman in 2 weeks.  She was given prescriptions  for percocet post-operatively.        Jena Rubi MD      I was present for the entire case and agree with the above note.

## 2017-03-21 NOTE — DISCHARGE INSTRUCTIONS

## 2017-03-21 NOTE — H&P
CHIEF COMPLAINT:     Mrs. Nicole is a 31 y.o. female presenting for difficulty urinating     PRESENTING ILLNESS:     Dolores Nicole is a 31 y.o. female was seen previously for difficulty urinating in a patient with history of urethral stricture.  She states that the urination continues to be an issue. She still has to splash water on herself in order to urinate and that it continues to be painful in spite of going to physical therapy. Neisha Lemon, PT, has been releasing some muscle tension but she states that the area that is tender is the urethra. Vaginally, she is better. However, today with my exam, she could not relax enough to allow even a urojet to be injected into the bladder. She notes that sometimes the urine sprays rather than being a stream.      REVIEW OF SYSTEMS:     Review of Systems   Constitutional: Negative.   HENT: Negative.   Eyes: Negative.   Respiratory: Negative.   Cardiovascular: Negative.   Gastrointestinal: Negative for diarrhea.   Genitourinary: Positive for dysuria and urgency.   Musculoskeletal: Negative.   Skin: Negative.   Neurological: Negative.   Endo/Heme/Allergies: Negative.   Psychiatric/Behavioral: Negative.      PATIENT HISTORY:     Past Medical History   Diagnosis Date    Anxiety disorder         OCD                Past Surgical History   Procedure Laterality Date    Tonsillectomy, adenoidectomy, bilateral myringotomy and tubes        Cystoscopy w/ retrogrades        Tympanoplasty        Anterior cruciate ligament repair        Miles tooth extraction           2004                Family History   Problem Relation Age of Onset    Hypertension Father      Cancer Maternal Grandfather         Lung Cancer    Cancer Paternal Grandmother      Cancer Paternal Grandfather             Social History    Marital status:               Social History Main Topics    Smoking status: Never Smoker    Smokeless tobacco: Never Used    Alcohol use Yes          Comment: social     Drug use: No    Sexual activity: Yes       Partners: Male         Comment: Nuvaring         Allergies:  Ceclor [cefaclor]; Entex [pseudoephedrine tannate]; and Hydrocodone     Medications:         Outpatient Encounter Prescriptions as of 2/10/2017   Medication Sig Dispense Refill    clonazepam (KLONOPIN) 1 MG tablet Take 1 mg by mouth once daily.         LO LOESTRIN FE 1 mg-10 mcg (24)/10 mcg (2) Tab          fluvoxaMINE (LUVOX) 100 MG tablet Take 100 mg by mouth.        LIDOCAINE 2 %, VALIUM 5 MG, BACLOFEN 4 % SUPPOSITORY Place 1 suppository rectally 2 (two) times daily. 30 each 3    lidocaine HCL 2% (XYLOCAINE) 2 % jelly Apply topically once daily. 240 mL 3    methen-m.blue-s.phos-phsal-hyo 118-10-40.8-36 mg Cap Take 1 capsule by mouth 4 (four) times daily. 120 capsule 1    tamsulosin (FLOMAX) 0.4 mg Cp24 Take 1 capsule (0.4 mg total) by mouth once daily. 30 capsule 1      No facility-administered encounter medications on file as of 2/10/2017.             PHYSICAL EXAMINATION:     The patient generally appears in good health, is appropriately interactive, and is in no apparent distress.     Skin: No lesions.     Mental: Cooperative with normal affect.     Neuro: Grossly intact.     HEENT: Normal. No evidence of lymphadenopathy.     Chest: normal inspiratory effort.     Abdomen: BS active. Soft, non-tender. No masses or organomegaly. Bladder is not palpable. No evidence of flank discomfort. No evidence of inguinal hernia.     Extremities: No clubbing, cyanosis, or edema       IMPRESSION:          Encounter Diagnoses   Name Primary?    High-tone pelvic floor dysfunction Yes    Incomplete emptying of bladder      Pain in urethral meatus           PLAN:     1. Cystoscopy under anesthesia with botox of the area around the bladder neck and urethra today  2. Consent obtained in clinic  3. All questions answered  4. UA today negative for infection      Jena Rubi MD  Urology, PGY-2  Pager#  [phone]    Patient seen in holding.  No changes in clinical condition.  Proceed with planned procedure.

## 2017-03-21 NOTE — ANESTHESIA RELEASE NOTE
"Anesthesia Release from PACU Note    Patient: Dolores Nicole    Procedure(s) Performed: Procedure(s) (LRB):  CYSTOSCOPY (N/A)  INJECTION-BOTOX TO THE BLADDER NECK (N/A)    Anesthesia type: MAC    Post pain: Adequate analgesia    Post assessment: no apparent anesthetic complications, tolerated procedure well and no evidence of recall    Last Vitals:   Visit Vitals    /75 (BP Location: Left arm, Patient Position: Sitting, BP Method: Automatic)    Pulse 63    Temp 36.6 °C (97.9 °F) (Oral)    Resp 16    Ht 5' 7" (1.702 m)    Wt 70.3 kg (155 lb)    LMP 03/19/2017    SpO2 100%    Breastfeeding No    BMI 24.28 kg/m2       Post vital signs: stable    Level of consciousness: awake, alert  and oriented    Nausea/Vomiting: no nausea/no vomiting    Complications: none    Airway Patency: patent    Respiratory: unassisted, spontaneous ventilation, room air    Cardiovascular: stable and blood pressure at baseline    Hydration: euvolemic  "

## 2017-03-21 NOTE — DISCHARGE SUMMARY
[facility]  Discharge Note  Short Stay    Admit Date: 3/21/2017    Discharge Date and Time: 3/21/2017    Attending Physician: Marie Bergman MD     Discharge Provider: Jena Rubi    Diagnoses:  Active Hospital Problems    Diagnosis  POA    *Pelvic floor dysfunction [M62.89]  Yes      Resolved Hospital Problems    Diagnosis Date Resolved POA    Dysuria [R30.0] 12/06/2013 Yes       Discharged Condition: good    Hospital Course: Patient was admitted for botox of the bladder neck and uretra. She tolerated the procedure well with no complications.    Final Diagnoses: Same as principal problem.    Disposition: Home or Self Care    Follow up/Patient Instructions:    Medications:  Reconciled Home Medications:   Current Discharge Medication List      START taking these medications    Details   polyethylene glycol (GLYCOLAX) 17 gram PwPk Take 17 g by mouth once daily.  Qty: 30 packet, Refills: 0      tramadol (ULTRAM) 50 mg tablet Take 1 tablet (50 mg total) by mouth every 6 (six) hours as needed for Pain.  Qty: 15 tablet, Refills: 0         CONTINUE these medications which have NOT CHANGED    Details   clonazepam (KLONOPIN) 1 MG tablet Take 1 mg by mouth nightly.       fluvoxaMINE (LUVOX) 100 MG tablet Take 100 mg by mouth.      LIDOCAINE 2 %, VALIUM 5 MG, BACLOFEN 4 % SUPPOSITORY Place 1 suppository rectally 2 (two) times daily.  Qty: 30 each, Refills: 3    Associated Diagnoses: High-tone pelvic floor dysfunction; Incomplete emptying of bladder      lidocaine HCL 2% (XYLOCAINE) 2 % jelly Apply topically once daily.  Qty: 240 mL, Refills: 3    Associated Diagnoses: Pain in urethral meatus      LO LOESTRIN FE 1 mg-10 mcg (24)/10 mcg (2) Tab       tamsulosin (FLOMAX) 0.4 mg Cp24 Take 1 capsule (0.4 mg total) by mouth once daily.  Qty: 30 capsule, Refills: 1    Associated Diagnoses: Incomplete bladder emptying         STOP taking these medications       methen-m.blue-s.phos-phsal-hyo 118-10-40.8-36 mg Cap  Comments:   Reason for Stopping:               Discharge Procedure Orders  Diet general     Activity as tolerated     Call MD for:  temperature >100.4     Call MD for:  persistent nausea and vomiting     Call MD for:  severe uncontrolled pain     No dressing needed       Follow-up Information     Follow up with Marie Bergman MD In 2 weeks.    Specialty:  Urology    Why:  post op    Contact information:    [address]  [facility] [address]  [phone]              Jena Rubi MD  Urology, PGY-2  Pager# [phone]    As above.

## 2017-03-21 NOTE — TRANSFER OF CARE
"Anesthesia Transfer of Care Note    Patient: Dolores Nicole    Procedure(s) Performed: Procedure(s) (LRB):  CYSTOSCOPY (N/A)  INJECTION-BOTOX TO THE BLADDER NECK (N/A)    Patient location: PACU    Anesthesia Type: general    Transport from OR: Transported from OR on 6-10 L/min O2 by face mask with adequate spontaneous ventilation    Post pain: adequate analgesia    Post assessment: no apparent anesthetic complications    Post vital signs: stable    Level of consciousness: awake    Nausea/Vomiting: no nausea/vomiting    Complications: none          Last vitals:   Visit Vitals    /66 (BP Location: Right arm)    Pulse 64    Temp 36.6 °C (97.9 °F) (Oral)    Resp 18    Ht 5' 7" (1.702 m)    Wt 70.3 kg (155 lb)    LMP 03/19/2017    SpO2 100%    Breastfeeding No    BMI 24.28 kg/m2     "

## 2017-03-27 ENCOUNTER — TELEPHONE (OUTPATIENT)
Dept: UROLOGY | Facility: CLINIC | Age: 32
End: 2017-03-27

## 2017-03-27 NOTE — TELEPHONE ENCOUNTER
----- Message from Nisa Sanford sent at 3/27/2017 11:32 AM CDT -----  Contact: pt [phone]  Pt states she is still having burning and pain following her procedure and would like to discuss with the nurse. Pain level 7 after urinating. Yesterday pain and burning was all day and not only following urinating. Please call pt.

## 2017-03-28 DIAGNOSIS — N39.0 URINARY TRACT INFECTION WITHOUT HEMATURIA, SITE UNSPECIFIED: Primary | ICD-10-CM

## 2017-03-29 ENCOUNTER — LAB VISIT (OUTPATIENT)
Dept: LAB | Facility: HOSPITAL | Age: 32
End: 2017-03-29
Attending: UROLOGY
Payer: COMMERCIAL

## 2017-03-29 DIAGNOSIS — N39.0 URINARY TRACT INFECTION WITHOUT HEMATURIA, SITE UNSPECIFIED: ICD-10-CM

## 2017-03-29 PROCEDURE — 87086 URINE CULTURE/COLONY COUNT: CPT

## 2017-03-31 ENCOUNTER — TELEPHONE (OUTPATIENT)
Dept: UROLOGY | Facility: CLINIC | Age: 32
End: 2017-03-31

## 2017-03-31 LAB — BACTERIA UR CULT: NO GROWTH

## 2017-04-05 ENCOUNTER — OFFICE VISIT (OUTPATIENT)
Dept: UROLOGY | Facility: CLINIC | Age: 32
End: 2017-04-05
Payer: COMMERCIAL

## 2017-04-05 VITALS
HEIGHT: 67 IN | HEART RATE: 93 BPM | SYSTOLIC BLOOD PRESSURE: 112 MMHG | WEIGHT: 153 LBS | BODY MASS INDEX: 24.01 KG/M2 | DIASTOLIC BLOOD PRESSURE: 76 MMHG

## 2017-04-05 DIAGNOSIS — R10.2 PELVIC PAIN IN FEMALE: Primary | ICD-10-CM

## 2017-04-05 DIAGNOSIS — R39.11 URINARY HESITANCY: ICD-10-CM

## 2017-04-05 PROCEDURE — 99999 PR PBB SHADOW E&M-EST. PATIENT-LVL III: CPT | Mod: PBBFAC,,, | Performed by: UROLOGY

## 2017-04-05 PROCEDURE — 99024 POSTOP FOLLOW-UP VISIT: CPT | Mod: S$GLB,,, | Performed by: UROLOGY

## 2017-04-05 RX ORDER — GABAPENTIN 100 MG/1
100 CAPSULE ORAL NIGHTLY
Qty: 63 CAPSULE | Refills: 0 | Status: SHIPPED | OUTPATIENT
Start: 2017-04-05 | End: 2017-05-05

## 2017-04-05 RX ORDER — GABAPENTIN 300 MG/1
300 CAPSULE ORAL NIGHTLY
Qty: 30 CAPSULE | Refills: 11 | Status: SHIPPED | OUTPATIENT
Start: 2017-04-05 | End: 2017-07-07

## 2017-04-10 NOTE — PROGRESS NOTES
CHIEF COMPLAINT:    Mrs. Nicole is a 31 y.o. female presenting for a follow up after cystoscopy, botox injection of the bladder neck, history of urethral stricture    PRESENTING ILLNESS:    Dolores Nicole is a 31 y.o. female who returns for follow up.  She was found to have some scarring, however, the urethra was able to be calibrated with a 20 Fr female sound.  She states that the stream is now continuous, has an easier time starting her stream but continues to have to use the lidocaine gel.  She also has to put warm water on herself to get it started, not every time but more than not.  She describes a burning, shooting pain that starts in the urethra and goes up into the abdomen.  She has this spontaneously, not just when she urinates.  She states that she is glad to take things in a stepwise manner.      REVIEW OF SYSTEMS:    Review of Systems   Constitutional: Negative.    HENT: Negative.    Eyes: Negative.    Respiratory: Negative.    Cardiovascular: Negative.    Gastrointestinal: Negative for abdominal pain.   Genitourinary:        Urethral pain see HPI   Musculoskeletal: Negative.    Skin: Negative.    Neurological: Negative.    Endo/Heme/Allergies: Negative.    Psychiatric/Behavioral: Negative.      PATIENT HISTORY:    Past Medical History:   Diagnosis Date    Anxiety disorder     OCD       Past Surgical History:   Procedure Laterality Date    ANTERIOR CRUCIATE LIGAMENT REPAIR      CYSTOSCOPY W/ RETROGRADES      TONSILLECTOMY, ADENOIDECTOMY, BILATERAL MYRINGOTOMY AND TUBES      TYMPANOPLASTY      WISDOM TOOTH EXTRACTION      2004       Family History   Problem Relation Age of Onset    Hypertension Father     Cancer Maternal Grandfather      Lung Cancer    Cancer Paternal Grandmother     Cancer Paternal Grandfather      Social History    Marital status:      Social History Main Topics    Smoking status: Never Smoker    Smokeless tobacco: Never Used    Alcohol use Yes      Comment: social     Drug use: No    Sexual activity: Yes     Partners: Male      Comment: Nuvaring       Allergies:  Ceclor [cefaclor]; Entex [pseudoephedrine tannate]; and Hydrocodone    Medications:  Outpatient Encounter Prescriptions as of 4/5/2017   Medication Sig Dispense Refill    clonazepam (KLONOPIN) 1 MG tablet Take 1 mg by mouth nightly.       fluvoxaMINE (LUVOX) 100 MG tablet Take 100 mg by mouth.      LIDOCAINE 2 %, VALIUM 5 MG, BACLOFEN 4 % SUPPOSITORY Place 1 suppository rectally 2 (two) times daily. 30 each 3    lidocaine HCL 2% (XYLOCAINE) 2 % jelly Apply topically once daily. 240 mL 3    LO LOESTRIN FE 1 mg-10 mcg (24)/10 mcg (2) Tab       gabapentin (NEURONTIN) 100 MG capsule Take 1 capsule (100 mg total) by mouth every evening. 63 capsule 0    gabapentin (NEURONTIN) 300 MG capsule Take 1 capsule (300 mg total) by mouth every evening. 30 capsule 11    [DISCONTINUED] polyethylene glycol (GLYCOLAX) 17 gram PwPk Take 17 g by mouth once daily. 30 packet 0    [DISCONTINUED] tamsulosin (FLOMAX) 0.4 mg Cp24 Take 1 capsule (0.4 mg total) by mouth once daily. 30 capsule 1     No facility-administered encounter medications on file as of 4/5/2017.          PHYSICAL EXAMINATION:    The patient generally appears in good health, is appropriately interactive, and is in no apparent distress.    Skin: No lesions.    Mental: Cooperative with normal affect.    Neuro: Grossly intact.    HEENT: Normal. No evidence of lymphadenopathy.    Chest:  normal inspiratory effort.    Extremities: No clubbing, cyanosis, or edema      IMPRESSION:    Encounter Diagnoses   Name Primary?    Pelvic pain in female Yes       PLAN:    1.  Will try gabapentin since she described the pain as a shooting one.  Discussed that this is a small dose, will do the dose escalation.  2.  Follow up in 1 month.  3.  Discussed that the urethral stricture does not appear to be a problem but if she notes a decreased in the caliber of the stream or starts to  spray, may need to have surgery.

## 2017-05-03 ENCOUNTER — OFFICE VISIT (OUTPATIENT)
Dept: UROLOGY | Facility: CLINIC | Age: 32
End: 2017-05-03
Payer: COMMERCIAL

## 2017-05-03 VITALS
DIASTOLIC BLOOD PRESSURE: 77 MMHG | BODY MASS INDEX: 24.8 KG/M2 | WEIGHT: 158 LBS | HEART RATE: 72 BPM | SYSTOLIC BLOOD PRESSURE: 107 MMHG | HEIGHT: 67 IN

## 2017-05-03 DIAGNOSIS — M62.89 PELVIC FLOOR DYSFUNCTION: ICD-10-CM

## 2017-05-03 DIAGNOSIS — R30.0 SPASTIC DYSURIA: Primary | ICD-10-CM

## 2017-05-03 DIAGNOSIS — N35.9 URETHRAL STRICTURE, UNSPECIFIED STRICTURE TYPE: ICD-10-CM

## 2017-05-03 PROCEDURE — 1160F RVW MEDS BY RX/DR IN RCRD: CPT | Mod: S$GLB,,, | Performed by: UROLOGY

## 2017-05-03 PROCEDURE — 81002 URINALYSIS NONAUTO W/O SCOPE: CPT | Mod: S$GLB,,, | Performed by: UROLOGY

## 2017-05-03 PROCEDURE — 99999 PR PBB SHADOW E&M-EST. PATIENT-LVL III: CPT | Mod: PBBFAC,,, | Performed by: UROLOGY

## 2017-05-03 PROCEDURE — 99213 OFFICE O/P EST LOW 20 MIN: CPT | Mod: 25,S$GLB,, | Performed by: UROLOGY

## 2017-05-03 NOTE — MR AVS SNAPSHOT
[facility] - Urology 4th Floor  [address]  [facility] [address]  Phone: [phone]                  Dolores Nicole   5/3/2017 11:20 AM   Office Visit    Description:  Female : 1985   Provider:  Marie Bergman MD   Department:  [facility] - Urology 4th Floor           Reason for Visit     Post-op Evaluation     Dysuria                To Do List           Goals (5 Years of Data)     None      [facility]     [facility] -  Assistance  Unless otherwise directed by your provider, please contact [facility], our nurse care line that is available for  assistance.     Registered nurses in the [facility] provide: appointment scheduling, clinical advisement, health education, and other advisory services.  Call: [phone] (toll free)               Medications           Message regarding Medications     Verify the changes and/or additions to your medication regime listed below are the same as discussed with your clinician today.  If any of these changes or additions are incorrect, please notify your healthcare provider.             Verify that the below list of medications is an accurate representation of the medications you are currently taking.  If none reported, the list may be blank. If incorrect, please contact your healthcare provider. Carry this list with you in case of emergency.           Current Medications     clonazepam (KLONOPIN) 1 MG tablet Take 1 mg by mouth nightly.     fluvoxaMINE (LUVOX) 100 MG tablet Take 100 mg by mouth.    gabapentin (NEURONTIN) 100 MG capsule Take 1 capsule (100 mg total) by mouth every evening.    gabapentin (NEURONTIN) 300 MG capsule Take 1 capsule (300 mg total) by mouth every evening.    LIDOCAINE 2 %, VALIUM 5 MG, BACLOFEN 4 % SUPPOSITORY Place 1 suppository rectally 2 (two) times daily.    lidocaine HCL 2% (XYLOCAINE) 2 % jelly Apply topically once daily.    LO LOESTRIN FE 1 mg-10 mcg (24)/10 mcg (2) Tab  "           Clinical Reference Information           Your Vitals Were     BP Pulse Height Weight BMI    107/77 72 5' 7" (1.702 m) 71.7 kg (158 lb) 24.75 kg/m2      Blood Pressure          Most Recent Value    BP  107/77      Allergies as of 5/3/2017     Ceclor [Cefaclor]    Entex [Pseudoephedrine Tannate]    Hydrocodone      Immunizations Administered on Date of Encounter - 5/3/2017     None      [facility] Sign-Up     Activating your [facility] account is as easy as 1-2-3!     1) Visit [email or web address], select Sign Up Now, enter this activation code and your date of birth, then select Next.  [email or web address]  Expires: 6/17/2017 12:43 PM      2) Create a username and password to use when you visit [facility] in the future and select a security question in case you lose your password and select Next.    3) Enter your e-mail address and click Sign Up!    Additional Information  If you have questions, please e-mail [email or web address] or call [phone] to talk to our [facility] staff. Remember, [facility] is NOT to be used for urgent needs. For medical emergencies, dial 911.         Language Assistance Services     ATTENTION: Language assistance services are available, free of charge. Please call [phone].      ATENCIÓN: Si habla español, tiene a rivas disposición servicios gratuitos de asistencia lingüística. Llame al [phone].     CHÚ Ý: N?u b?n nói Ti?ng Vi?t, có các d?ch v? h? tr? ngôn ng? mi?n phí dành cho b?n. G?i s? [phone].         [facility] 4th Floor complies with applicable Federal civil rights laws and does not discriminate on the basis of race, color, national origin, age, disability, or sex.        "

## 2017-05-03 NOTE — PROGRESS NOTES
CHIEF COMPLAINT:    Mrs. Nicole is a 32 y.o. female presenting for a follow up accompanied by her mother.    PRESENTING ILLNESS:    Dolores Nicole is a 32 y.o. female who states that she is better with the gabapentin.  She takes 100 mg, does not always take it i.e. 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  She will start after Memorial Day.  She does not have to use the lidocaine gel as much and she is not splashing warm water on herself as often.  She states her stream remains strong.  No symptoms of a UTI.  She wonders if the lidocaine and water were getting to be a habit, but it was necessary at the beginning.  She has started running again and feels better, also bowels are better with running.  She states she has some hesitancy with a very full bladder and will double void.  (she does not get breaks while in the case at [employer].)  Voiding is getting to be a better though not completely normal.      REVIEW OF SYSTEMS:    Review of Systems   Constitutional: Negative.    HENT: Negative.    Eyes: Negative.    Respiratory: Negative.    Cardiovascular: Negative.    Gastrointestinal: Negative.    Genitourinary: Positive for urgency.   Musculoskeletal: Negative.    Skin: Negative.    Neurological: Negative.    Endo/Heme/Allergies: Negative.    Psychiatric/Behavioral: Negative.          PATIENT HISTORY:    Past Medical History:   Diagnosis Date    Anxiety disorder     OCD       Past Surgical History:   Procedure Laterality Date    ANTERIOR CRUCIATE LIGAMENT REPAIR      CYSTOSCOPY W/ RETROGRADES      TONSILLECTOMY, ADENOIDECTOMY, BILATERAL MYRINGOTOMY AND TUBES      TYMPANOPLASTY      WISDOM TOOTH EXTRACTION      2004       Family History   Problem Relation Age of Onset    Hypertension Father     Cancer Maternal Grandfather      Lung Cancer    Cancer Paternal Grandmother     Cancer Paternal  Grandfather      Social History    Marital status:      Social History Main Topics    Smoking status: Never Smoker    Smokeless tobacco: Never Used    Alcohol use Yes      Comment: social    Drug use: No    Sexual activity: Yes     Partners: Male      Comment: Nuvaring       Allergies:  Ceclor [cefaclor]; Entex [pseudoephedrine tannate]; and Hydrocodone    Medications:  Outpatient Encounter Prescriptions as of 5/3/2017   Medication Sig Dispense Refill    clonazepam (KLONOPIN) 1 MG tablet Take 1 mg by mouth nightly.       fluvoxaMINE (LUVOX) 100 MG tablet Take 100 mg by mouth.      gabapentin (NEURONTIN) 100 MG capsule Take 1 capsule (100 mg total) by mouth every evening. 63 capsule 0    gabapentin (NEURONTIN) 300 MG capsule Take 1 capsule (300 mg total) by mouth every evening. 30 capsule 11    LIDOCAINE 2 %, VALIUM 5 MG, BACLOFEN 4 % SUPPOSITORY Place 1 suppository rectally 2 (two) times daily. 30 each 3    lidocaine HCL 2% (XYLOCAINE) 2 % jelly Apply topically once daily. 240 mL 3    LO LOESTRIN FE 1 mg-10 mcg (24)/10 mcg (2) Tab        No facility-administered encounter medications on file as of 5/3/2017.          PHYSICAL EXAMINATION:    The patient generally appears in good health, is appropriately interactive, and is in no apparent distress.    Skin: No lesions.    Mental: Cooperative with normal affect.    Neuro: Grossly intact.    HEENT: Normal. No evidence of lymphadenopathy.    Chest:  normal inspiratory effort.    Extremities: No clubbing, cyanosis, or edema    LABS:    UA 1.000, pH 7, otherwise, negative    IMPRESSION:    Dysuria, improving  Voiding dysfunction  History of urethral stricture    PLAN:    1. Continue weaning off of the lidocaine and stop using the warm water splash if she can  2.  Discussed the dose escalation of the gabapentin.    3.  Follow up in 2 months she wishes to have a Fri Afternoon appointment.

## 2017-06-27 ENCOUNTER — PATIENT MESSAGE (OUTPATIENT)
Dept: UROLOGY | Facility: CLINIC | Age: 32
End: 2017-06-27

## 2017-07-07 ENCOUNTER — OFFICE VISIT (OUTPATIENT)
Dept: UROLOGY | Facility: CLINIC | Age: 32
End: 2017-07-07
Payer: COMMERCIAL

## 2017-07-07 VITALS
HEART RATE: 71 BPM | WEIGHT: 159.19 LBS | DIASTOLIC BLOOD PRESSURE: 78 MMHG | SYSTOLIC BLOOD PRESSURE: 114 MMHG | BODY MASS INDEX: 24.99 KG/M2 | HEIGHT: 67 IN

## 2017-07-07 DIAGNOSIS — R10.2 PELVIC PAIN IN FEMALE: Primary | ICD-10-CM

## 2017-07-07 DIAGNOSIS — R30.0 DYSURIA: ICD-10-CM

## 2017-07-07 PROCEDURE — 99214 OFFICE O/P EST MOD 30 MIN: CPT | Mod: S$GLB,,, | Performed by: UROLOGY

## 2017-07-07 PROCEDURE — 99999 PR PBB SHADOW E&M-EST. PATIENT-LVL III: CPT | Mod: PBBFAC,,, | Performed by: UROLOGY

## 2017-07-07 RX ORDER — GABAPENTIN 100 MG/1
100 CAPSULE ORAL 2 TIMES DAILY
Qty: 60 CAPSULE | Refills: 11 | Status: SHIPPED | OUTPATIENT
Start: 2017-07-07

## 2017-07-08 NOTE — PROGRESS NOTES
CHIEF COMPLAINT:    Mrs. Nicole is a 32 y.o. female presenting for a follow up on painful urination.    PRESENTING ILLNESS:    Dolores Nicole is a 32 y.o. female who returns for follow up.  She states she no longer uses the water or lidocaine to help herself urinate.  However, she is not sure if it is habit but she will hold herself to help with the pain.  She finds that the gabapentin helps to alleviate the pain at night and into the morning.  However, one night she decided to take 600 mg at night and had significant relief for most of the morning as well.  However, this was a time when she had an upper respiratory tract infection.  She has found several instances when if she was out by the pool or getting hot outside, the sweat irritates the urethra.  Sometimes she has a split stream.  She really likes her new job and has found that they are very understanding and supportive.  She seems markedly more relaxed.      REVIEW OF SYSTEMS:    Review of Systems   Constitutional: Negative.    HENT: Negative.    Eyes: Negative.    Respiratory: Negative.    Cardiovascular: Negative.    Gastrointestinal: Negative.    Genitourinary: Positive for dysuria.   Musculoskeletal: Negative.    Skin: Negative.    Neurological: Negative.    Endo/Heme/Allergies: Negative.    Psychiatric/Behavioral: Negative.      PATIENT HISTORY:    Past Medical History:   Diagnosis Date    Anxiety disorder     OCD       Past Surgical History:   Procedure Laterality Date    ANTERIOR CRUCIATE LIGAMENT REPAIR      CYSTOSCOPY W/ RETROGRADES      TONSILLECTOMY, ADENOIDECTOMY, BILATERAL MYRINGOTOMY AND TUBES      TYMPANOPLASTY      WISDOM TOOTH EXTRACTION      2004     Family History   Problem Relation Age of Onset    Hypertension Father     Cancer Maternal Grandfather      Lung Cancer    Cancer Paternal Grandmother     Cancer Paternal Grandfather      Social History    Marital status:      Social History Main Topics    Smoking status: Never  Smoker    Smokeless tobacco: Never Used    Alcohol use Yes      Comment: social    Drug use: No    Sexual activity: Yes     Partners: Male      Comment: Nuvaring       Allergies:  Ceclor [cefaclor]; Entex [pseudoephedrine tannate]; and Hydrocodone    Medications:  Outpatient Encounter Prescriptions as of 7/7/2017   Medication Sig Dispense Refill    clonazepam (KLONOPIN) 1 MG tablet Take 1 mg by mouth nightly.       fluvoxaMINE (LUVOX) 100 MG tablet Take 100 mg by mouth.      gabapentin (NEURONTIN) 300 MG capsule Take 1 capsule (300 mg total) by mouth every evening. 30 capsule 11    LIDOCAINE 2 %, VALIUM 5 MG, BACLOFEN 4 % SUPPOSITORY Place 1 suppository rectally 2 (two) times daily. 30 each 3    lidocaine HCL 2% (XYLOCAINE) 2 % jelly Apply topically once daily. 240 mL 3    LO LOESTRIN FE 1 mg-10 mcg (24)/10 mcg (2) Tab       gabapentin (NEURONTIN) 100 MG capsule Take 1 capsule (100 mg total) by mouth 2 (two) times daily. In the morning and at noon. 60 capsule 11     No facility-administered encounter medications on file as of 7/7/2017.          PHYSICAL EXAMINATION:    The patient generally appears in good health, is appropriately interactive, and is in no apparent distress.    Skin: No lesions.    Mental: Cooperative with normal affect.    Neuro: Grossly intact.    HEENT: Normal. No evidence of lymphadenopathy.    Chest:  normal inspiratory effort.    Abdomen:  Soft, non-tender. No masses or organomegaly. Bladder is not palpable. No evidence of flank discomfort. No evidence of inguinal hernia.    Extremities: No clubbing, cyanosis, or edema      LABS:    Lab Results   Component Value Date    BUN 16 03/21/2017    CREATININE 0.8 03/21/2017     UA 1.015, pH 7 tr prot, otherwise, negative    IMPRESSION:    Encounter Diagnoses   Name Primary?    Pelvic pain in female Yes    Dysuria        PLAN:    1.  Will prescribe 100 mg gabapentin in am and mid day.  Continue the 300 mg q hs  2.  Try to return to as  normal of a pattern of voiding as possible.  Do not hold self if possible  3.  Start doing the relaxation techniques that she did with physical therapy  4.  Follow up in 3 months.   5.  She had requested the medical records, directed her to the MR dept.  We discussed healthy boundaries in the workplace.   6.  She asked about alternative therapies for pain, as her cousin has had PTNS and we discussed accupuncture or reflexology     I spent 25 minutes with the patient of which more than half was spent in direct consultation with the patient in regards to our treatment and plan.

## 2017-07-10 ENCOUNTER — PATIENT MESSAGE (OUTPATIENT)
Dept: UROLOGY | Facility: CLINIC | Age: 32
End: 2017-07-10

## 2017-07-11 ENCOUNTER — TELEPHONE (OUTPATIENT)
Dept: UROLOGY | Facility: CLINIC | Age: 32
End: 2017-07-11

## 2017-07-11 DIAGNOSIS — R30.0 SPASTIC DYSURIA: Primary | ICD-10-CM

## 2017-07-12 RX ORDER — GABAPENTIN 300 MG/1
300 CAPSULE ORAL NIGHTLY
Qty: 30 CAPSULE | Refills: 11 | Status: SHIPPED | OUTPATIENT
Start: 2017-07-12 | End: 2018-07-17 | Stop reason: SDUPTHER

## 2017-07-12 NOTE — TELEPHONE ENCOUNTER
Received refill request for the gabapentin 300 mg q hs.  Sent Rx with instructions to the pharmacy that she should take 100 mg bid during the day, and 300 mg at night.

## 2018-07-17 ENCOUNTER — TELEPHONE (OUTPATIENT)
Dept: UROLOGY | Facility: CLINIC | Age: 33
End: 2018-07-17

## 2018-07-17 DIAGNOSIS — R30.0 SPASTIC DYSURIA: ICD-10-CM

## 2018-07-17 RX ORDER — GABAPENTIN 300 MG/1
300 CAPSULE ORAL NIGHTLY
Qty: 30 CAPSULE | Refills: 0 | Status: SHIPPED | OUTPATIENT
Start: 2018-07-17 | End: 2018-07-17 | Stop reason: SDUPTHER

## 2018-07-17 RX ORDER — GABAPENTIN 300 MG/1
300 CAPSULE ORAL NIGHTLY
Qty: 30 CAPSULE | Refills: 0 | Status: SHIPPED | OUTPATIENT
Start: 2018-07-17

## 2019-06-21 NOTE — ANESTHESIA POSTPROCEDURE EVALUATION
"Anesthesia Post Evaluation    Patient: Dolores Nicole    Procedure(s) Performed: Procedure(s) (LRB):  CYSTOSCOPY (N/A)  INJECTION-BOTOX TO THE BLADDER NECK (N/A)    Final Anesthesia Type: MAC  Patient location during evaluation: PACU  Patient participation: Yes- Able to Participate  Level of consciousness: awake and alert and oriented  Post-procedure vital signs: reviewed and stable  Pain management: adequate  Airway patency: patent  PONV status at discharge: No PONV  Anesthetic complications: no      Cardiovascular status: hemodynamically stable  Respiratory status: unassisted, spontaneous ventilation and room air  Hydration status: euvolemic  Follow-up not needed.        Visit Vitals    /75 (BP Location: Left arm, Patient Position: Sitting, BP Method: Automatic)    Pulse 63    Temp 36.6 °C (97.9 °F) (Oral)    Resp 16    Ht 5' 7" (1.702 m)    Wt 70.3 kg (155 lb)    LMP 03/19/2017    SpO2 100%    Breastfeeding No    BMI 24.28 kg/m2       Pain/Rm Score: Pain Assessment Performed: Yes (3/21/2017  1:45 PM)  Presence of Pain: denies (3/21/2017 11:03 AM)  Pain Rating Prior to Med Admin: 4 (3/21/2017  2:04 PM)  Rm Score: 10 (3/21/2017  1:45 PM)      "
Bret Charles)  Internal Medicine  [address] [place] [address]  Phone: [phone]  Fax: [phone]  Follow Up Time:     Bettye Hazel  Phone: (   )    -  Fax: (   )    -  Follow Up Time:

## (undated) DEVICE — PACK CYSTO

## (undated) DEVICE — SYR TB SLIP TIP W/O NDL 1ML

## (undated) DEVICE — SOL IRR WATER STRL 3000 ML

## (undated) DEVICE — TRAY CYSTO BASIN

## (undated) DEVICE — NDL SPINAL 22GA X 2 1/2 IN

## (undated) DEVICE — NDL EMG BOTOX 4X37MM 27G

## (undated) DEVICE — SET CYSTO IRRIGATION UNIV SPIK